# Patient Record
Sex: FEMALE | Race: WHITE | NOT HISPANIC OR LATINO | Employment: UNEMPLOYED | URBAN - METROPOLITAN AREA
[De-identification: names, ages, dates, MRNs, and addresses within clinical notes are randomized per-mention and may not be internally consistent; named-entity substitution may affect disease eponyms.]

---

## 2020-07-09 ENCOUNTER — HOSPITAL ENCOUNTER (EMERGENCY)
Facility: HOSPITAL | Age: 30
Discharge: HOME | End: 2020-07-09
Attending: EMERGENCY MEDICINE
Payer: MEDICAID

## 2020-07-09 VITALS
WEIGHT: 125 LBS | SYSTOLIC BLOOD PRESSURE: 113 MMHG | OXYGEN SATURATION: 98 % | BODY MASS INDEX: 22.15 KG/M2 | HEART RATE: 105 BPM | TEMPERATURE: 98 F | RESPIRATION RATE: 20 BRPM | DIASTOLIC BLOOD PRESSURE: 78 MMHG | HEIGHT: 63 IN

## 2020-07-09 DIAGNOSIS — Z20.822 COVID-19 VIRUS NOT DETECTED: Primary | ICD-10-CM

## 2020-07-09 DIAGNOSIS — F17.200 CURRENT SMOKER: ICD-10-CM

## 2020-07-09 LAB — SARS-COV-2 RDRP RESP QL NAA+PROBE: NEGATIVE

## 2020-07-09 PROCEDURE — U0002 COVID-19 LAB TEST NON-CDC: HCPCS

## 2020-07-09 PROCEDURE — 99282 EMERGENCY DEPT VISIT SF MDM: CPT

## 2020-07-09 RX ORDER — TRAZODONE HYDROCHLORIDE 50 MG/1
TABLET ORAL
COMMUNITY

## 2020-07-09 NOTE — Clinical Note
"Aarti "Lianna Nayak was seen and treated in our emergency department on 7/9/2020.     COVID-19 is present in our communities across the state. There is limited testing for COVID at this time, so not all patients can be tested. In this situation, your employee meets the following criteria:    Aarti Nayak has met the criteria for COVID-19 testing and has a NEGATIVE result. The employee can return to work once they are asymptomatic for 72 hours without the use of fever reducing medications (Tylenol, Motrin, etc).     If you have any questions or concerns, or if I can be of further assistance, please do not hesitate to contact me.    Sincerely,             Pamela Jose PA-C"

## 2020-07-09 NOTE — ED PROVIDER NOTES
History      Chief Complaint   Patient presents with    COVID-19 Concerns     PT brought in by [facility] for covid screening       Review of patient's allergies indicates:  No Known Allergies     HPI   HPI    7/9/2020, 3:33 PM   History obtained from the patient      History of Present Illness: Aarti Nayak is a 30 y.o. female patient who presents to the Emergency Department for COVID19 testing.  Denies symptoms. Patient brought in by [facility] for covid screening.  Denies fever, vomiting, diarrhea, cough, nasal congestion, sore throat, chest pain, SOB, headache, dizziness.       Arrival mode: 's Office    PCP: No primary care provider on file.       Past Medical History:  History reviewed. No pertinent past medical history.    Past Surgical History:  Past Surgical History:   Procedure Laterality Date    ELBOW SURGERY      HYSTERECTOMY           Family History:  History reviewed. No pertinent family history.    Social History:  Social History     Tobacco Use    Smoking status: Current Every Day Smoker     Packs/day: 0.50     Types: Cigarettes    Smokeless tobacco: Never Used   Substance and Sexual Activity    Alcohol use: Yes    Drug use: Not on file    Sexual activity: Not on file       ROS   Review of Systems   Constitutional: Negative for chills and fever.   HENT: Negative for congestion and rhinorrhea.    Eyes: Negative for discharge and redness.   Respiratory: Negative for cough and wheezing.    Cardiovascular: Negative for chest pain and palpitations.   Gastrointestinal: Negative for diarrhea, nausea and vomiting.   Genitourinary: Negative for dysuria and frequency.   Musculoskeletal: Negative for back pain and neck pain.   Skin: Negative for rash and wound.   Neurological: Negative for dizziness and headaches.       Physical Exam      Initial Vitals [07/09/20 1428]   BP Pulse Resp Temp SpO2   113/78 105 20 98.1 °F (36.7 °C) 98 %      MAP       --          Physical Exam  Nursing Notes and Vital Signs  "Reviewed.  Constitutional: Patient is in no apparent distress. Awake and alert. Well-developed and well-nourished.  Head: Atraumatic. Normocephalic.  Eyes: PERRL. EOM intact. Conjunctivae are not pale. No scleral icterus.  ENT: Mucous membranes are moist. Oropharynx is clear and symmetric.    Neck: Supple. Full ROM. No lymphadenopathy.  Cardiovascular: Regular rate. Regular rhythm. No murmurs, rubs, or gallops. Distal pulses are 2+ and symmetric.  Pulmonary/Chest: No respiratory distress. Clear to auscultation bilaterally. No wheezing, rales, or rhonchi.  Abdominal: Soft and non-distended.  There is no tenderness.  No rebound, guarding, or rigidity. Good bowel sounds.  Genitourinary: No CVA tenderness  Musculoskeletal: Moves all extremities. No obvious deformities. No edema. No calf tenderness.  Skin: Warm and dry.  Neurological:  Alert, awake, and appropriate.  Normal speech.  No acute focal neurological deficits are appreciated.  Psychiatric: Normal affect. Good eye contact. Appropriate in content.    ED Course    Procedures  ED Vital Signs:  Vitals:    07/09/20 1428   BP: 113/78   Pulse: 105   Resp: 20   Temp: 98.1 °F (36.7 °C)   TempSrc: Oral   SpO2: 98%   Weight: 56.7 kg (125 lb)   Height: 5' 3" (1.6 m)       Abnormal Lab Results:  Labs Reviewed   SARS-COV-2 RNA AMPLIFICATION, QUAL        All Lab Results:  Results for orders placed or performed during the hospital encounter of 07/09/20   COVID-19 Rapid Screening   Result Value Ref Range    SARS-CoV-2 RNA, Amplification, Qual Negative Negative         Imaging Results:  Imaging Results    None                 The Emergency Provider reviewed the vital signs and test results, which are outlined above.    ED Discussion     3:34 PM: Discussed with pt all pertinent ED information and results. Discussed pt dx and plan of tx. Gave pt all f/u and return to the ED instructions. All questions and concerns were addressed at this time. Pt expresses understanding of " information and instructions, and is comfortable with plan to discharge. Pt is stable for discharge.    I discussed with patient and/or family/caretaker that evaluation in the ED does not suggest any emergent or life threatening medical conditions requiring immediate intervention beyond what was provided in the ED, and I believe patient is safe for discharge.  Regardless, an unremarkable evaluation in the ED does not preclude the development or presence of a serious of life threatening condition. As such, patient was instructed to return immediately for any worsening or change in current symptoms.      ED Medication(s):  Medications - No data to display    Discharge Medication List as of 7/9/2020  3:35 PM          Follow-up Information     [facility]. Schedule an appointment as soon as possible for a visit in 3 days.    Contact information:  [address] [place] [address]    Phone:  [phone]           [facility] - .    Specialty: Emergency Medicine  Why: If symptoms worsen  Contact information:  [address] [address] [facility] [address] [phone]                   Medical Decision Making                  Clinical Impression       ICD-10-CM ICD-9-CM   1. Covid-19 Virus not Detected  [id number]    2. Current smoker  F17.200 305.1       Disposition:   Disposition: Discharged  Condition: Stable           Pamela Jose PA-C  07/09/20 2050

## 2020-07-10 ENCOUNTER — PES CALL (OUTPATIENT)
Dept: ADMINISTRATIVE | Facility: CLINIC | Age: 30
End: 2020-07-10

## 2021-03-31 ENCOUNTER — OFFICE VISIT (OUTPATIENT)
Dept: OPHTHALMOLOGY | Facility: CLINIC | Age: 31
End: 2021-03-31
Payer: MEDICAID

## 2021-03-31 DIAGNOSIS — H16.001 ULCER OF RIGHT CORNEA: Primary | ICD-10-CM

## 2021-03-31 PROCEDURE — 92004 PR EYE EXAM, NEW PATIENT,COMPREHESV: ICD-10-PCS | Mod: 25,S$PBB,, | Performed by: OPHTHALMOLOGY

## 2021-03-31 PROCEDURE — 99212 OFFICE O/P EST SF 10 MIN: CPT | Mod: PBBFAC | Performed by: OPHTHALMOLOGY

## 2021-03-31 PROCEDURE — 99999 PR PBB SHADOW E&M-EST. PATIENT-LVL II: CPT | Mod: PBBFAC,,, | Performed by: OPHTHALMOLOGY

## 2021-03-31 PROCEDURE — 65430 CORNEAL SMEAR: CPT | Mod: S$PBB,RT,, | Performed by: OPHTHALMOLOGY

## 2021-03-31 PROCEDURE — 87186 SC STD MICRODIL/AGAR DIL: CPT | Performed by: OPHTHALMOLOGY

## 2021-03-31 PROCEDURE — 87070 CULTURE OTHR SPECIMN AEROBIC: CPT | Performed by: OPHTHALMOLOGY

## 2021-03-31 PROCEDURE — 87102 FUNGUS ISOLATION CULTURE: CPT | Performed by: OPHTHALMOLOGY

## 2021-03-31 PROCEDURE — 92004 COMPRE OPH EXAM NEW PT 1/>: CPT | Mod: 25,S$PBB,, | Performed by: OPHTHALMOLOGY

## 2021-03-31 PROCEDURE — 65430 PR CORNEAL SMEAR: ICD-10-PCS | Mod: S$PBB,RT,, | Performed by: OPHTHALMOLOGY

## 2021-03-31 PROCEDURE — 65430 CORNEAL SMEAR: CPT | Mod: PBBFAC,RT | Performed by: OPHTHALMOLOGY

## 2021-03-31 PROCEDURE — 99999 PR PBB SHADOW E&M-EST. PATIENT-LVL II: ICD-10-PCS | Mod: PBBFAC,,, | Performed by: OPHTHALMOLOGY

## 2021-03-31 PROCEDURE — 87077 CULTURE AEROBIC IDENTIFY: CPT | Performed by: OPHTHALMOLOGY

## 2021-03-31 RX ORDER — DEXTROAMPHETAMINE SACCHARATE, AMPHETAMINE ASPARTATE, DEXTROAMPHETAMINE SULFATE AND AMPHETAMINE SULFATE 7.5; 7.5; 7.5; 7.5 MG/1; MG/1; MG/1; MG/1
TABLET ORAL
COMMUNITY

## 2021-03-31 RX ORDER — TOBRAMYCIN 3 MG/ML
1 SOLUTION/ DROPS OPHTHALMIC 4 TIMES DAILY
Qty: 5 ML | Refills: 3 | Status: SHIPPED | OUTPATIENT
Start: 2021-03-31 | End: 2021-04-10

## 2021-03-31 RX ORDER — MOXIFLOXACIN 5 MG/ML
1 SOLUTION/ DROPS OPHTHALMIC 4 TIMES DAILY
Qty: 3 ML | Refills: 3 | Status: SHIPPED | OUTPATIENT
Start: 2021-03-31

## 2021-03-31 RX ORDER — CLONIDINE 0.3 MG/24H
PATCH, EXTENDED RELEASE TRANSDERMAL
COMMUNITY

## 2021-04-03 LAB — BACTERIA SPEC AEROBE CULT: ABNORMAL

## 2021-04-05 LAB — BACTERIA SPEC AEROBE CULT: ABNORMAL

## 2021-04-08 ENCOUNTER — TELEPHONE (OUTPATIENT)
Dept: PHARMACY | Facility: CLINIC | Age: 31
End: 2021-04-08

## 2021-04-08 ENCOUNTER — OFFICE VISIT (OUTPATIENT)
Dept: OPHTHALMOLOGY | Facility: CLINIC | Age: 31
End: 2021-04-08
Payer: MEDICAID

## 2021-04-08 DIAGNOSIS — H16.001 ULCER OF RIGHT CORNEA: Primary | ICD-10-CM

## 2021-04-08 PROCEDURE — 99213 OFFICE O/P EST LOW 20 MIN: CPT | Mod: PBBFAC | Performed by: OPHTHALMOLOGY

## 2021-04-08 PROCEDURE — 99214 OFFICE O/P EST MOD 30 MIN: CPT | Mod: S$PBB,,, | Performed by: OPHTHALMOLOGY

## 2021-04-08 PROCEDURE — 99214 PR OFFICE/OUTPT VISIT, EST, LEVL IV, 30-39 MIN: ICD-10-PCS | Mod: S$PBB,,, | Performed by: OPHTHALMOLOGY

## 2021-04-08 PROCEDURE — 99999 PR PBB SHADOW E&M-EST. PATIENT-LVL III: CPT | Mod: PBBFAC,,, | Performed by: OPHTHALMOLOGY

## 2021-04-08 PROCEDURE — 99999 PR PBB SHADOW E&M-EST. PATIENT-LVL III: ICD-10-PCS | Mod: PBBFAC,,, | Performed by: OPHTHALMOLOGY

## 2021-04-14 ENCOUNTER — TELEPHONE (OUTPATIENT)
Dept: OPHTHALMOLOGY | Facility: CLINIC | Age: 31
End: 2021-04-14

## 2021-04-21 ENCOUNTER — OFFICE VISIT (OUTPATIENT)
Dept: OPHTHALMOLOGY | Facility: CLINIC | Age: 31
End: 2021-04-21
Payer: MEDICAID

## 2021-04-21 DIAGNOSIS — H16.001 ULCER OF RIGHT CORNEA: ICD-10-CM

## 2021-04-21 DIAGNOSIS — A49.02 MRSA INFECTION (METHICILLIN-RESISTANT STAPHYLOCOCCUS AUREUS): Primary | ICD-10-CM

## 2021-04-21 PROCEDURE — 99213 OFFICE O/P EST LOW 20 MIN: CPT | Mod: PBBFAC | Performed by: OPHTHALMOLOGY

## 2021-04-21 PROCEDURE — 99999 PR PBB SHADOW E&M-EST. PATIENT-LVL III: CPT | Mod: PBBFAC,,, | Performed by: OPHTHALMOLOGY

## 2021-04-21 PROCEDURE — 92014 PR EYE EXAM, EST PATIENT,COMPREHESV: ICD-10-PCS | Mod: S$PBB,,, | Performed by: OPHTHALMOLOGY

## 2021-04-21 PROCEDURE — 92014 COMPRE OPH EXAM EST PT 1/>: CPT | Mod: S$PBB,,, | Performed by: OPHTHALMOLOGY

## 2021-04-21 PROCEDURE — 99999 PR PBB SHADOW E&M-EST. PATIENT-LVL III: ICD-10-PCS | Mod: PBBFAC,,, | Performed by: OPHTHALMOLOGY

## 2021-04-21 RX ORDER — CLINDAMYCIN HYDROCHLORIDE 150 MG/1
150 CAPSULE ORAL 4 TIMES DAILY
Qty: 40 CAPSULE | Refills: 3 | Status: SHIPPED | OUTPATIENT
Start: 2021-04-21 | End: 2021-05-01

## 2021-04-26 LAB — FUNGUS SPEC CULT: NORMAL

## 2021-04-28 ENCOUNTER — OFFICE VISIT (OUTPATIENT)
Dept: OPHTHALMOLOGY | Facility: CLINIC | Age: 31
End: 2021-04-28
Payer: MEDICAID

## 2021-04-28 DIAGNOSIS — H16.001 ULCER OF RIGHT CORNEA: ICD-10-CM

## 2021-04-28 DIAGNOSIS — H16.401 CORNEAL NEOVASCULARIZATION OF RIGHT EYE: ICD-10-CM

## 2021-04-28 DIAGNOSIS — A49.02 MRSA INFECTION (METHICILLIN-RESISTANT STAPHYLOCOCCUS AUREUS): Primary | ICD-10-CM

## 2021-04-28 PROCEDURE — 99213 OFFICE O/P EST LOW 20 MIN: CPT | Mod: PBBFAC | Performed by: OPHTHALMOLOGY

## 2021-04-28 PROCEDURE — 92014 PR EYE EXAM, EST PATIENT,COMPREHESV: ICD-10-PCS | Mod: S$PBB,,, | Performed by: OPHTHALMOLOGY

## 2021-04-28 PROCEDURE — 99999 PR PBB SHADOW E&M-EST. PATIENT-LVL III: CPT | Mod: PBBFAC,,, | Performed by: OPHTHALMOLOGY

## 2021-04-28 PROCEDURE — 99999 PR PBB SHADOW E&M-EST. PATIENT-LVL III: ICD-10-PCS | Mod: PBBFAC,,, | Performed by: OPHTHALMOLOGY

## 2021-04-28 PROCEDURE — 92014 COMPRE OPH EXAM EST PT 1/>: CPT | Mod: S$PBB,,, | Performed by: OPHTHALMOLOGY

## 2021-04-28 RX ORDER — NITROFURANTOIN 25; 75 MG/1; MG/1
CAPSULE ORAL
COMMUNITY
Start: 2020-12-12

## 2021-04-28 RX ORDER — CLONIDINE HYDROCHLORIDE 0.2 MG/1
0.2 TABLET ORAL NIGHTLY
COMMUNITY
Start: 2021-03-18

## 2021-04-28 RX ORDER — PHENAZOPYRIDINE HYDROCHLORIDE 200 MG/1
TABLET, FILM COATED ORAL
COMMUNITY
Start: 2020-12-12

## 2021-04-28 RX ORDER — PREDNISOLONE ACETATE 10 MG/ML
1 SUSPENSION/ DROPS OPHTHALMIC 4 TIMES DAILY
Qty: 10 ML | Refills: 3 | Status: SHIPPED | OUTPATIENT
Start: 2021-04-28

## 2021-04-28 RX ORDER — VALACYCLOVIR HYDROCHLORIDE 1 G/1
1000 TABLET, FILM COATED ORAL 3 TIMES DAILY
COMMUNITY
Start: 2021-01-25

## 2021-04-28 RX ORDER — LORAZEPAM 1 MG/1
1 TABLET ORAL 3 TIMES DAILY
COMMUNITY
Start: 2021-01-25